# Patient Record
Sex: MALE | Race: WHITE | Employment: UNEMPLOYED | URBAN - METROPOLITAN AREA
[De-identification: names, ages, dates, MRNs, and addresses within clinical notes are randomized per-mention and may not be internally consistent; named-entity substitution may affect disease eponyms.]

---
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
## 2023-03-24 ENCOUNTER — HOSPITAL ENCOUNTER (EMERGENCY)
Age: 23
Discharge: HOME | End: 2023-03-24
Attending: FAMILY MEDICINE
Payer: COMMERCIAL

## 2023-03-24 VITALS
SYSTOLIC BLOOD PRESSURE: 140 MMHG | OXYGEN SATURATION: 100 % | BODY MASS INDEX: 39.8 KG/M2 | HEIGHT: 70 IN | DIASTOLIC BLOOD PRESSURE: 80 MMHG | HEART RATE: 74 BPM | WEIGHT: 278 LBS | TEMPERATURE: 97.8 F | RESPIRATION RATE: 16 BRPM

## 2023-03-24 DIAGNOSIS — S76.212A STRAIN OF LEFT GROIN: Primary | ICD-10-CM

## 2023-03-24 PROCEDURE — 99282 EMERGENCY DEPT VISIT SF MDM: CPT

## 2023-03-24 ASSESSMENT — PAIN - FUNCTIONAL ASSESSMENT: PAIN_FUNCTIONAL_ASSESSMENT: 0-10

## 2023-03-24 ASSESSMENT — PAIN DESCRIPTION - ORIENTATION: ORIENTATION: LEFT

## 2023-03-24 ASSESSMENT — PAIN DESCRIPTION - FREQUENCY: FREQUENCY: CONTINUOUS

## 2023-03-24 ASSESSMENT — PAIN SCALES - GENERAL: PAINLEVEL_OUTOF10: 2

## 2023-03-24 ASSESSMENT — PAIN DESCRIPTION - DESCRIPTORS: DESCRIPTORS: SORE

## 2023-03-24 ASSESSMENT — PAIN DESCRIPTION - PAIN TYPE: TYPE: ACUTE PAIN

## 2023-03-24 ASSESSMENT — PAIN DESCRIPTION - LOCATION: LOCATION: GROIN;ABDOMEN

## 2023-03-24 NOTE — Clinical Note
Jessy Hough was seen and treated in our emergency department on 3/24/2023. He may return to work on 03/27/2023. If you have any questions or concerns, please don't hesitate to call.       Debo Morales MD

## 2023-03-24 NOTE — ED PROVIDER NOTES
HPI:  3/24/23,   Time: 10:18 AM EDT         Ivelisse Saucedo is a 23 y.o. male presenting to the ED for an aching type pain that started yesterday, in the left lower part of his abdomen and in the left groin. It did hurt somewhat to take a step. Today the pain is much better, he denied any change in bowel habits. He denied nausea or vomiting. 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 He did not notice any particular moment or event that precipitated the pain as it started gradually yesterday. ROS:   Pertinent positives and negatives are stated within HPI, all other systems reviewed and are negative.  --------------------------------------------- PAST HISTORY ---------------------------------------------  Past Medical History:  has a past medical history of Congenital hip dysplasia and Hearing loss. Past Surgical History:  has a past surgical history that includes Myringotomy Tympanostomy Tube Placement; pyloromyotomy; Tonsillectomy; and Adenoidectomy. Social History:  reports that he has never smoked. He has never used smokeless tobacco. He reports that he does not drink alcohol and does not use drugs. Family History: family history is not on file. The patients home medications have been reviewed. Allergies: Augmentin [amoxicillin-pot clavulanate]    -------------------------------------------------- RESULTS -------------------------------------------------  All laboratory and radiology results have been personally reviewed by myself   LABS:  No results found for this visit on 03/24/23. RADIOLOGY:  Interpreted by Radiologist.  No orders to display       ------------------------- NURSING NOTES AND VITALS REVIEWED ---------------------------   The nursing notes within the ED encounter and vital signs as below have been reviewed.    BP (!) 140/80   Pulse 74   Temp 97.8 °F (36.6 °C) (Temporal)   Resp 16   Ht 5' 10\" (1.778 m)   Wt 278 lb (126.1 kg)   SpO2 100%   BMI 39.89 kg/m²   Oxygen Saturation Interpretation: Normal      ---------------------------------------------------PHYSICAL EXAM--------------------------------------    Constitutional/General: Alert and oriented x3, well appearing, non toxic in NAD  Head: NC/AT  Eyes: PERRL, EOMI  Mouth: Oropharynx clear, handling secretions, no trismus  Neck: Supple, full ROM, no meningeal signs  Pulmonary: Lungs clear to auscultation bilaterally, no wheezes, rales, or rhonchi. Not in respiratory distress  Cardiovascular:  Regular rate and rhythm, no murmurs, gallops, or rubs. 2+ distal pulses  Abdomen: Soft, non tender, non distended, the left lower quadrant is nontender. No hernias or masses organomegaly were palpated. Genital exam:  The 2 testes in the scrotal sac, they are nontender and they are normal size. Extremities: Moves all extremities x 4. Warm and well perfused  Skin: warm and dry without rash  Neurologic: GCS 15,  Psych: Normal Affect      ------------------------------ ED COURSE/MEDICAL DECISION MAKING----------------------  Medications - No data to display      Medical Decision Making:    Straightforward; the pain is a muscular origin, we will give it rest and return to work in 3 days. Will take ibuprofen 400 to 600 mg 2-3 times a day as needed for the pain. Counseling: The emergency provider has spoken with the patient and discussed todays results, in addition to providing specific details for the plan of care and counseling regarding the diagnosis and prognosis. Questions are answered at this time and they are agreeable with the plan.      --------------------------------- IMPRESSION AND DISPOSITION ---------------------------------    IMPRESSION  1.  Strain of left groin        DISPOSITION  Disposition: Discharge to home  Patient condition is good                 Luz Magaña MD  03/24/23 0106

## 2024-01-24 ENCOUNTER — HOSPITAL ENCOUNTER (EMERGENCY)
Age: 24
Discharge: HOME | End: 2024-01-24
Payer: COMMERCIAL

## 2024-01-24 ENCOUNTER — APPOINTMENT (OUTPATIENT)
Dept: ULTRASOUND IMAGING | Age: 24
End: 2024-01-24
Payer: COMMERCIAL

## 2024-01-24 VITALS
OXYGEN SATURATION: 100 % | BODY MASS INDEX: 40.09 KG/M2 | HEART RATE: 93 BPM | TEMPERATURE: 98.6 F | SYSTOLIC BLOOD PRESSURE: 127 MMHG | RESPIRATION RATE: 18 BRPM | HEIGHT: 70 IN | DIASTOLIC BLOOD PRESSURE: 57 MMHG | WEIGHT: 280 LBS

## 2024-01-24 DIAGNOSIS — S86.912A STRAIN OF BOTH KNEES, INITIAL ENCOUNTER: Primary | ICD-10-CM

## 2024-01-24 DIAGNOSIS — S86.911A STRAIN OF BOTH KNEES, INITIAL ENCOUNTER: Primary | ICD-10-CM

## 2024-01-24 PROCEDURE — 93970 EXTREMITY STUDY: CPT

## 2024-01-24 PROCEDURE — 6370000000 HC RX 637 (ALT 250 FOR IP): Performed by: NURSE PRACTITIONER

## 2024-01-24 PROCEDURE — 99284 EMERGENCY DEPT VISIT MOD MDM: CPT

## 2024-01-24 RX ORDER — ACETAMINOPHEN 500 MG
1000 TABLET ORAL ONCE
Status: COMPLETED | OUTPATIENT
Start: 2024-01-24 | End: 2024-01-24

## 2024-01-24 RX ADMIN — ACETAMINOPHEN 1000 MG: 500 TABLET ORAL at 12:33

## 2024-01-24 ASSESSMENT — LIFESTYLE VARIABLES
HOW MANY STANDARD DRINKS CONTAINING ALCOHOL DO YOU HAVE ON A TYPICAL DAY: 1 OR 2
HOW OFTEN DO YOU HAVE A DRINK CONTAINING ALCOHOL: MONTHLY OR LESS

## 2024-01-24 NOTE — ED PROVIDER NOTES
[facility] EMERGENCY DEPARTMENT  ED  Encounter Note  Admit Date/RoomTime: 1/24/2024 12:17 PM  ED Room: Internal Waiting/IntWait    Independent [facility] Visit.   HPI:  1/24/24,   Time: 2:24 PM ANNETTE Crawley is a 23 y.o. male presenting to the ED for bilateral knee pain left worse than the right, beginning yesterday ago.  The complaint has been persistent, mild in severity, and worsened by nothing.  Presents for complaints of pain to the bilateral knees which she states began yesterday.  States he was sick last week he was laying around for a few days.  He denies any known falls or known injuries.  He states yesterday he began having pain to the bilateral knees.  Denies any shortness of breath or chest pain.  He denies any recent travel.  Denies any history of blood clots or clotting disorders.  He does have a documented history of hip dysplasia.  States he works as a  and walking makes the pain worse.    ROS:   Pertinent positives and negatives are stated within HPI, all other systems reviewed and are negative.  --------------------------------------------- PAST HISTORY ---------------------------------------------  Past Medical History:  has a past medical history of Congenital hip dysplasia and Hearing loss.    Past Surgical History:  has a past surgical history that includes Myringotomy Tympanostomy Tube Placement; pyloromyotomy; Tonsillectomy; and Adenoidectomy.    Social History:  reports that he has never smoked. He has never used smokeless tobacco. He reports that he does not drink alcohol and does not use drugs.    Family History: family history is not on file.     The patient’s home medications have been reviewed.    Allergies: Augmentin [amoxicillin-pot clavulanate]    -------------------------------------------------- RESULTS -------------------------------------------------  All laboratory and radiology results have been personally reviewed by myself